# Patient Record
(demographics unavailable — no encounter records)

---

## 2024-10-22 NOTE — HISTORY OF PRESENT ILLNESS
[FreeTextEntry1] : [id number] MIKY GIRMES Mar 1 1972 [phone]  52 y/o  *HTN *PVCs on clinic ECG-asymptomatic. *no prior cardiac history. *anemia-possible Fe def., s/p parathyroidectomy Jan '24.  here for followup. runs 45 minutes practices [employer] do, exercises extensively BPs 120s/80 no chest pain palpitations syncope dyspnea.  meds; amlodipine 10 olmesartan 40 daily.  Reviewed labs Jun '24 : Cr 0.76  *ECG NSR no ischemic changes, no LVH. *Holter Apr '22 NSR PVC burden <0.1% Ave HR 72 *Home sleep study mild GARO no need for CAP unless excessive daytime somnolence or cardiac disease. *BP 24 cuff average BPs 132/90 awake 136/93 asleep 118/77 *EST - Apr '22: no ischemic changes 99% MPHR *Echo Feb '22: EF 60% mod LVH, mild diast. dysfx *Apr '22 Jax & renin WNL, Cr & K WNL. works in garden, exercises occasionally occasional [employer] wolf PCP-kassidy *Oct '23: Cr 0.7 K 4.4 Ca 11.2 (>10.5, AST alk phos 54/154 (45/120)

## 2025-03-04 NOTE — ASSESSMENT
[FreeTextEntry1] : Hyperparathyroidism, primary (252.01) (E21.0) Hypercalcemia (275.42) (E83.52) Primary hyperparathyroidism S/p parathyroidectomy in January 2024  Osteoporosis at the level of the wrist  Feels ok, calcium is acceptable in 3/2024  Osteoporosis on the level of the wrist, -2.9, on the spine -2.4 We can wait and monitor till the next bone density which should be in November to see if there is improvement after the surgery and will decide on treatment then    Vit D 1000 once daily  Weight bearing exercise  CA 1200 mg from the diet  Should improved after surgery.  Next bone density will be November 2025   Vit D Def Take vit D 1000 daily   High Alk phos  Check bone specific alk phos   Spent 30 minutes face to face and non face to face reviewing labs, imaging and management plans with the patient.    RTC 9 months for 30 min

## 2025-03-04 NOTE — PHYSICAL EXAM
[Alert] : alert [No Neck Mass] : no neck mass was observed [No LAD] : no lymphadenopathy [No Accessory Muscle Use] : no accessory muscle use [Normal S1, S2] : normal S1 and S2 [No Joint Swelling] : no joint swelling seen [de-identified] : Surgical scar noted  [de-identified] : Has left knee brace and right wrist for a fall

## 2025-03-04 NOTE — HISTORY OF PRESENT ILLNESS
[FreeTextEntry1] : Follow up hypercalcemia evaluation   PMH PVCs and preeclampsia    Case history:   Stated her all her problems started with COVID vaccine, had history of PVCs in 2008.  Her Calcium was 11.4 with PTH 53 in 7/23, she thinks she was taking calcium supplements from her son supplements. Stopped Ca supplements. Found to have primary hyperparathyroidism. S/p parathyroidectomy in 1/2024   DXA Shows osteoporosis  Osteoporotic facture risk: No, had traumatic fracture her for 4th and left 5th toes 30 years ago  Needs root canal  PPI use: None  Steroid use none  Menstrual history, stopped at the age of 50 Kidney stones None  Family history: No calcium problems    S/p parathyroidectomy in 1/2024 Feels fine, ca is acceptable on repeat labs   Reviewing bone density, it shows that patient has osteoporosis on the level of the wrist.    All other ROS reviewed, and they are negative. Labs reviewed.

## 2025-06-29 NOTE — HISTORY OF PRESENT ILLNESS
[FreeTextEntry1] : [id number] MIKY GRIMES Mar 1 1972 [phone]  52 y/o  *HTN *PVCs on clinic ECG-asymptomatic. *no prior cardiac history. *anemia-possible Fe def., s/p parathyroidectomy Jan '24.  here for followup. reviewed labs Comp, T4 TSH,   A1c- 6.3% predm 5.7-6.4 comp WNL  Jun '25: FLP -  HDL 37    ASCVD risk 2.6% Low Current 10-Year "Emphasize lifestyle to reduce risk factors" "no justification for the routine administration of low-dose aspirin"  reviewed 3 day ziopatch NSR ave rate 78 PVC <1% burden  *ECG NSR no ischemic changes, no LVH. *Holter Apr '22 NSR PVC burden <0.1% Ave HR 72 *Home sleep study mild GARO no need for CAP unless excessive daytime somnolence or cardiac disease. *BP 24 cuff average BPs 132/90 awake 136/93 asleep 118/77 *EST - Apr '22: no ischemic changes 99% MPHR *Echo Feb '22: EF 60% mod LVH, mild diast. dysfx *Apr '22 Jax & renin WNL, Cr & K WNL. works in garden, exercises occasionally occasional tae [employer] wolf PCP-kassidy *Oct '23: Cr 0.7 K 4.4 Ca 11.2 (>10.5, AST alk phos 54/154 (45/120)

## 2025-06-29 NOTE — HISTORY OF PRESENT ILLNESS
[FreeTextEntry1] : [id number] MIKY GRIMES Mar 1 1972 [phone]  52 y/o  *HTN *PVCs on clinic ECG-asymptomatic. *no prior cardiac history. *anemia-possible Fe def., s/p parathyroidectomy Jan '24.  here for followup. reviewed labs Comp, T4 TSH,   A1c- 6.3% predm 5.7-6.4 comp WNL  Jun '25: FLP -  HDL 37    ASCVD risk 2.6% Low Current 10-Year "Emphasize lifestyle to reduce risk factors" "no justification for the routine administration of low-dose aspirin"  reviewed 3 day ziopatch NSR ave rate 78 PVC <1% burden  *ECG NSR no ischemic changes, no LVH. *Holter Apr '22 NSR PVC burden <0.1% Ave HR 72 *Home sleep study mild GARO no need for CAP unless excessive daytime somnolence or cardiac disease. *BP 24 cuff average BPs 132/90 awake 136/93 asleep 118/77 *EST - Apr '22: no ischemic changes 99% MPHR *Echo Feb '22: EF 60% mod LVH, mild diast. dysfx *Apr '22 Jax & renin WNL, Cr & K WNL. works in garden, exercises occasionally occasional tae foreign wolf PCP-kassidy *Oct '23: Cr 0.7 K 4.4 Ca 11.2 (>10.5, AST alk phos 54/154 (45/120)